# Patient Record
Sex: MALE | Race: WHITE | URBAN - NONMETROPOLITAN AREA
[De-identification: names, ages, dates, MRNs, and addresses within clinical notes are randomized per-mention and may not be internally consistent; named-entity substitution may affect disease eponyms.]

---

## 2018-03-25 ENCOUNTER — OFFICE VISIT (OUTPATIENT)
Dept: PRIMARY CARE CLINIC | Age: 63
End: 2018-03-25
Payer: COMMERCIAL

## 2018-03-25 VITALS
WEIGHT: 218 LBS | HEIGHT: 73 IN | RESPIRATION RATE: 18 BRPM | SYSTOLIC BLOOD PRESSURE: 126 MMHG | BODY MASS INDEX: 28.89 KG/M2 | OXYGEN SATURATION: 98 % | HEART RATE: 72 BPM | DIASTOLIC BLOOD PRESSURE: 80 MMHG

## 2018-03-25 DIAGNOSIS — H10.9 CONJUNCTIVITIS OF LEFT EYE, UNSPECIFIED CONJUNCTIVITIS TYPE: Primary | ICD-10-CM

## 2018-03-25 PROCEDURE — 99213 OFFICE O/P EST LOW 20 MIN: CPT | Performed by: NURSE PRACTITIONER

## 2018-03-25 RX ORDER — FLUOXETINE HYDROCHLORIDE 40 MG/1
CAPSULE ORAL
Refills: 3 | COMMUNITY
Start: 2018-02-06

## 2018-03-25 RX ORDER — LISINOPRIL 10 MG/1
TABLET ORAL
Refills: 3 | COMMUNITY
Start: 2018-01-01

## 2018-03-25 RX ORDER — FLUOXETINE HYDROCHLORIDE 40 MG/1
40 CAPSULE ORAL
COMMUNITY
Start: 2018-02-06

## 2018-03-25 RX ORDER — LISINOPRIL 10 MG/1
10 TABLET ORAL
COMMUNITY
Start: 2017-04-07

## 2018-03-25 RX ORDER — CIPROFLOXACIN HYDROCHLORIDE 3.5 MG/ML
1 SOLUTION/ DROPS TOPICAL
Qty: 120 DROP | Refills: 1 | Status: SHIPPED | OUTPATIENT
Start: 2018-03-25 | End: 2018-04-04

## 2018-03-25 ASSESSMENT — ENCOUNTER SYMPTOMS
EYE PAIN: 0
EYE REDNESS: 1
BLOOD IN STOOL: 0
ORTHOPNEA: 0
EYE DISCHARGE: 1
DIARRHEA: 0
SINUS PAIN: 0
SORE THROAT: 0
SPUTUM PRODUCTION: 0
BLURRED VISION: 0
CONSTIPATION: 0
DOUBLE VISION: 0
ABDOMINAL PAIN: 0
SHORTNESS OF BREATH: 0
VOMITING: 0
STRIDOR: 0
WHEEZING: 0
COUGH: 0
NAUSEA: 0

## 2018-03-25 NOTE — PROGRESS NOTES
has no cervical adenopathy. Neurological: He is alert and oriented to person, place, and time. No cranial nerve deficit. Gait normal. Coordination normal.   Skin: Skin is warm and dry. No rash noted. He is not diaphoretic. Psychiatric: Mood, memory, affect and judgment normal.   Nursing note and vitals reviewed. Vitals:    03/25/18 1441   BP: 126/80   Site: Left Arm   Position: Sitting   Cuff Size: Large Adult   Pulse: 72   Resp: 18   SpO2: 98%   Weight: 218 lb (98.9 kg)   Height: 6' 1\" (1.854 m)       Assessment:  1. Conjunctivitis of left eye, unspecified conjunctivitis type  Eye gtts as directed- discussed hygiene and prevention of spreading it to others or to other eye. Plan:  As noted above. Follow up with PCP for routine visit. Call sooner with concerns prior.     Electronically signed by Jennifer Orr CNP on 3/25/2018 at 4:38 PM

## 2020-05-18 ENCOUNTER — TELEPHONE (OUTPATIENT)
Dept: SURGERY | Age: 65
End: 2020-05-18

## 2020-05-18 NOTE — TELEPHONE ENCOUNTER
Mailed Dr Eduarda Kellogg colonoscopy paperwork for 10 yr repeat colonoscopy-per Dr Ezra Rosado patient is not having any problems and will fax information of where and when last scope was.

## 2020-08-03 ENCOUNTER — TELEPHONE (OUTPATIENT)
Dept: SURGERY | Age: 65
End: 2020-08-03

## 2020-08-03 NOTE — LETTER
[facility]         Patient:Toño Moulton           :1955           Surgical/Procedure Planned: Colonoscopy    Date & Location: 2020 At [facility]       Outpatient   Planned Length of OR: 30 min    Sedation: intravenous sedation        Estimated Cardiac Risk for Non-Cardiac Surgery/Procedure     Low______ Moderate______ High______    Medication Instructions - Clarification needed by this date:   -Insulin:  -Other medications:    ASA 81 mg  Hold ___ Days    Resume medications:     Lovenox indicated: _____Yes _____NO    Provider:Dr. Remedios Castillo of Provider Giving Orders for Medication holds:    _____________________________________________

## 2020-08-05 RX ORDER — BISACODYL 5 MG
10 TABLET, DELAYED RELEASE (ENTERIC COATED) ORAL ONCE
Qty: 2 TABLET | Refills: 0 | Status: SHIPPED | OUTPATIENT
Start: 2020-08-05 | End: 2020-08-05

## 2020-08-05 RX ORDER — POLYETHYLENE GLYCOL 3350, SODIUM SULFATE ANHYDROUS, SODIUM BICARBONATE, SODIUM CHLORIDE, POTASSIUM CHLORIDE 236; 22.74; 6.74; 5.86; 2.97 G/4L; G/4L; G/4L; G/4L; G/4L
4 POWDER, FOR SOLUTION ORAL ONCE
Qty: 4000 ML | Refills: 0 | Status: SHIPPED | OUTPATIENT
Start: 2020-08-05 | End: 2020-08-05

## 2020-08-05 NOTE — TELEPHONE ENCOUNTER
Spoke with patient at this time scheduled for a Colonoscopy Wednesday 9/9/2020 with Dr. Sara Lopez at [facility]. Surgery instructions and bowel prep went over with patient at this time, denies any questions. Bowel prep sent to pharmacy of choice and all instructions mailed at this time. Acknowledges understanding of procedure and will call with any further questions.

## 2020-08-10 NOTE — TELEPHONE ENCOUNTER
Left message for patient per PCP to hold baby aspirin 7 days prior. Med Hold scanned in under media.

## 2020-08-28 ENCOUNTER — TELEPHONE (OUTPATIENT)
Dept: SURGERY | Age: 65
End: 2020-08-28

## 2020-08-28 NOTE — TELEPHONE ENCOUNTER
Patient has questions about taking his medication before his colonoscopy. Please call him back at [phone].